# Patient Record
Sex: MALE | Race: ASIAN | URBAN - METROPOLITAN AREA
[De-identification: names, ages, dates, MRNs, and addresses within clinical notes are randomized per-mention and may not be internally consistent; named-entity substitution may affect disease eponyms.]

---

## 2021-11-04 ENCOUNTER — TELEPHONE (OUTPATIENT)
Dept: UROLOGY | Age: 61
End: 2021-11-04

## 2021-11-04 NOTE — TELEPHONE ENCOUNTER
Scheduled for 10/17/2021 at 8am due to him having to work at [employer]. To make sure he had enough time to see Park and be at work.

## 2021-11-17 ENCOUNTER — OFFICE VISIT (OUTPATIENT)
Dept: UROLOGY | Age: 61
End: 2021-11-17
Payer: COMMERCIAL

## 2021-11-17 VITALS
HEIGHT: 67 IN | SYSTOLIC BLOOD PRESSURE: 146 MMHG | WEIGHT: 200 LBS | RESPIRATION RATE: 22 BRPM | TEMPERATURE: 98.5 F | HEART RATE: 93 BPM | OXYGEN SATURATION: 98 % | DIASTOLIC BLOOD PRESSURE: 99 MMHG | BODY MASS INDEX: 31.39 KG/M2

## 2021-11-17 DIAGNOSIS — R39.11 BENIGN PROSTATIC HYPERPLASIA WITH URINARY HESITANCY: ICD-10-CM

## 2021-11-17 DIAGNOSIS — R97.20 ELEVATED PSA: ICD-10-CM

## 2021-11-17 DIAGNOSIS — N40.1 BENIGN PROSTATIC HYPERPLASIA WITH URINARY HESITANCY: ICD-10-CM

## 2021-11-17 LAB
BILIRUB UR QL STRIP: NEGATIVE
GLUCOSE UR-MCNC: NEGATIVE MG/DL
KETONES P FAST UR STRIP-MCNC: NEGATIVE MG/DL
PH UR STRIP: 6 [PH] (ref 4.6–8)
PROT UR QL STRIP: NEGATIVE
SP GR UR STRIP: 1.02 (ref 1–1.03)
UA UROBILINOGEN AMB POC: NORMAL (ref 0.2–1)
URINALYSIS CLARITY POC: CLEAR
URINALYSIS COLOR POC: YELLOW
URINE BLOOD POC: NORMAL
URINE LEUKOCYTES POC: NEGATIVE
URINE NITRITES POC: NEGATIVE

## 2021-11-17 PROCEDURE — 99204 OFFICE O/P NEW MOD 45 MIN: CPT | Performed by: UROLOGY

## 2021-11-17 PROCEDURE — 81003 URINALYSIS AUTO W/O SCOPE: CPT | Performed by: UROLOGY

## 2021-11-17 RX ORDER — ATORVASTATIN CALCIUM 40 MG/1
TABLET, FILM COATED ORAL
COMMUNITY
Start: 2021-09-30

## 2021-11-17 RX ORDER — TAMSULOSIN HYDROCHLORIDE 0.4 MG/1
0.4 CAPSULE ORAL DAILY
Qty: 90 CAPSULE | Refills: 3 | Status: SHIPPED | OUTPATIENT
Start: 2021-11-17

## 2021-11-17 RX ORDER — ASPIRIN 81 MG/1
TABLET ORAL DAILY
COMMUNITY

## 2021-11-17 RX ORDER — OMEPRAZOLE MAGNESIUM 10 MG/1
GRANULE, DELAYED RELEASE ORAL
COMMUNITY

## 2021-11-17 NOTE — ASSESSMENT & PLAN NOTE
PSA is borderline risk. We discussed options including following the level vs biopsy.   We decided to reassess after treating his mild BPH symptoms

## 2021-11-17 NOTE — PROGRESS NOTES
HISTORY OF PRESENT ILLNESS  Barrington العراقي is a 63 y.o. male. Chief Complaint   Patient presents with    New Patient    Other     Wants a exam of the prostate     He had an insurance physical in July. His PSA was 4.44 7/27/21. Prior PSA 3/19/12 was 2.0  PSA 3/31/16 was 0.91. Repeat PSA was 3.8 with 12.9%. He has LUTS. He a slower stream, gradual onset. He wakes 1x per night. He is an early riser. He feels he empties. He denies dysuria. Coffee can give him frequency. He has some hesitancy with a full bladder. He has no family history of prostate cancer. Chronic Conditions Addressed Today     1. Elevated PSA     Current Assessment & Plan       PSA is borderline risk. We discussed options including following the level vs biopsy. We decided to reassess after treating his mild BPH symptoms          Relevant Medications     tamsulosin (FLOMAX) 0.4 mg capsule     Other Relevant Orders     PSA, DIAGNOSTIC (PROSTATE SPECIFIC AG)    2. Benign prostatic hyperplasia with urinary hesitancy     Current Assessment & Plan       He is mildly bothered. We discussed tamsulosin. The patient was instructed on the dosing of the medication and reason for taking it. We discussed the common and serious risks. The patient is advised to be cautious of side effects with a new medication. Rx given          Relevant Medications     tamsulosin (FLOMAX) 0.4 mg capsule     Other Relevant Orders     AMB POC URINALYSIS DIP STICK AUTO W/O MICRO     URINALYSIS W/MICROSCOPIC     PSA, DIAGNOSTIC (PROSTATE SPECIFIC AG)          Past Medical History:    PMHx (including negatives):  has a past medical history of Hypercholesterolemia and Hypertension. PSurgHx:  has a past surgical history that includes hx colonoscopy. PSocHx:  reports that he has never smoked. He has never used smokeless tobacco. He reports previous alcohol use. He reports that he does not use drugs.      ROS  Physical Exam  Constitutional: General: He is not in acute distress. Appearance: Normal appearance. HENT:      Head: Normocephalic and atraumatic. Eyes:      Extraocular Movements: Extraocular movements intact. Pupils: Pupils are equal, round, and reactive to light. Cardiovascular:      Rate and Rhythm: Normal rate and regular rhythm. Pulmonary:      Effort: Pulmonary effort is normal. No respiratory distress. Breath sounds: Normal breath sounds. No wheezing or rhonchi. Chest:   Breasts:      Right: No supraclavicular adenopathy. Left: No supraclavicular adenopathy. Genitourinary:     Penis: Normal. No phimosis, hypospadias or tenderness. Testes: Normal.         Right: Mass, tenderness or swelling not present. Left: Mass, tenderness or swelling not present. Epididymis:      Right: Normal. No mass or tenderness. Left: Normal. No mass or tenderness. Prostate: Enlarged (1+). Not tender and no nodules present. Rectum: Normal.   Musculoskeletal:         General: Normal range of motion. Lymphadenopathy:      Cervical: No cervical adenopathy. Upper Body:      Right upper body: No supraclavicular adenopathy. Left upper body: No supraclavicular adenopathy. Skin:     General: Skin is warm and dry. Neurological:      General: No focal deficit present. Mental Status: He is alert and oriented to person, place, and time. Psychiatric:         Mood and Affect: Mood normal.         Behavior: Behavior normal.       No Known Allergies   Prior to Admission medications    Medication Sig Start Date End Date Taking? Authorizing Provider   atorvastatin (LIPITOR) 40 mg tablet  9/30/21  Yes Provider, Historical   amlodipine besylate (NORVASC PO) amlodipine   Yes Provider, Historical   Omeprazole Magnesium (PriLOSEC) 10 mg suDR Prilosec   Yes Provider, Historical   aspirin delayed-release 81 mg tablet Take  by mouth daily.    Yes Provider, Historical   tamsulosin (FLOMAX) 0.4 mg capsule Take 1 Capsule by mouth daily. 11/17/21  Yes Konrad Rodriguez MD        ASSESSMENT and PLAN  Diagnoses and all orders for this visit:    1. Benign prostatic hyperplasia with urinary hesitancy  Assessment & Plan:   He is mildly bothered. We discussed tamsulosin. The patient was instructed on the dosing of the medication and reason for taking it. We discussed the common and serious risks. The patient is advised to be cautious of side effects with a new medication. Rx given    Orders:  -     AMB POC URINALYSIS DIP STICK AUTO W/O MICRO  -     URINALYSIS W/MICROSCOPIC  -     tamsulosin (FLOMAX) 0.4 mg capsule; Take 1 Capsule by mouth daily.  -     PSA, DIAGNOSTIC (PROSTATE SPECIFIC AG); Future    2. Elevated PSA  Assessment & Plan:   PSA is borderline risk. We discussed options including following the level vs biopsy. We decided to reassess after treating his mild BPH symptoms    Orders:  -     PSA, DIAGNOSTIC (PROSTATE SPECIFIC AG);  Future         Marylu Woodruff MD

## 2021-11-17 NOTE — LETTER
11/17/2021    Patient: Mel Finley   YOB: 1960   Date of Visit: 11/17/2021     Bret Partida, Lesley [address] [id number]  Via Fax: [phone]    Dear Bret Partida DO,      Thank you for referring Mr. Mel Finley to [address] [place] [address] for evaluation. My notes for this consultation are attached. If you have questions, please do not hesitate to call me. I look forward to following your patient along with you.       Sincerely,    Rommel Baires MD

## 2021-11-17 NOTE — PROGRESS NOTES
1. Have you been to the ER, urgent care clinic since your last visit? Hospitalized since your last visit? No    2. Have you seen or consulted any other health care providers outside of the [address] [place] [address] since your last visit? Include any pap smears or colon screening.  PCP  Chief Complaint   Patient presents with    New Patient    Other     Wants a exam of the prostate     Visit Vitals  BP (!) 146/99 (BP 1 Location: Left arm, BP Patient Position: Sitting, BP Cuff Size: Adult)   Pulse 93   Temp 98.5 °F (36.9 °C) (Temporal)   Resp 22   Ht 5' 7\" (1.702 m)   Wt 200 lb (90.7 kg)   SpO2 98%   BMI 31.32 kg/m²

## 2021-11-17 NOTE — ASSESSMENT & PLAN NOTE
He is mildly bothered. We discussed tamsulosin. The patient was instructed on the dosing of the medication and reason for taking it. We discussed the common and serious risks. The patient is advised to be cautious of side effects with a new medication.     Rx given

## 2021-11-18 LAB
APPEARANCE UR: CLEAR
BACTERIA #/AREA URNS HPF: NORMAL /[HPF]
BILIRUB UR QL STRIP: NEGATIVE
CASTS URNS QL MICRO: NORMAL /LPF
COLOR UR: YELLOW
EPI CELLS #/AREA URNS HPF: NORMAL /HPF (ref 0–10)
GLUCOSE UR QL: NEGATIVE
HGB UR QL STRIP: NEGATIVE
KETONES UR QL STRIP: NEGATIVE
LEUKOCYTE ESTERASE UR QL STRIP: NEGATIVE
MICRO URNS: NORMAL
MICRO URNS: NORMAL
NITRITE UR QL STRIP: NEGATIVE
PH UR STRIP: 6 [PH] (ref 5–7.5)
PROT UR QL STRIP: NEGATIVE
RBC #/AREA URNS HPF: NORMAL /HPF (ref 0–2)
SP GR UR: 1.02 (ref 1–1.03)
UROBILINOGEN UR STRIP-MCNC: 1 MG/DL (ref 0.2–1)
WBC #/AREA URNS HPF: NORMAL /HPF (ref 0–5)

## 2022-02-11 ENCOUNTER — TELEPHONE (OUTPATIENT)
Dept: UROLOGY | Age: 62
End: 2022-02-11

## 2022-02-11 NOTE — TELEPHONE ENCOUNTER
Patient was returning [facility] call he said he has already had his PSA done

## 2022-02-11 NOTE — TELEPHONE ENCOUNTER
Attempted to contact pt to remind him to have psa drawn prior to apt on Wednesday, no answer and mailbox is full

## 2022-02-16 ENCOUNTER — OFFICE VISIT (OUTPATIENT)
Dept: UROLOGY | Age: 62
End: 2022-02-16
Payer: COMMERCIAL

## 2022-02-16 VITALS — BODY MASS INDEX: 31.39 KG/M2 | HEIGHT: 67 IN | WEIGHT: 200 LBS

## 2022-02-16 DIAGNOSIS — R97.20 ELEVATED PSA: Primary | ICD-10-CM

## 2022-02-16 DIAGNOSIS — R39.11 BENIGN PROSTATIC HYPERPLASIA WITH URINARY HESITANCY: ICD-10-CM

## 2022-02-16 DIAGNOSIS — N40.1 BENIGN PROSTATIC HYPERPLASIA WITH URINARY HESITANCY: ICD-10-CM

## 2022-02-16 LAB
BILIRUB UR QL: NEGATIVE
GLUCOSE UR-MCNC: NEGATIVE MG/DL
KETONES P FAST UR STRIP-MCNC: NEGATIVE MG/DL
PH UR STRIP: 7 [PH] (ref 4.6–8)
PROT UR QL STRIP: NEGATIVE
SP GR UR STRIP: 1.01 (ref 1–1.03)
UA UROBILINOGEN AMB POC: NORMAL (ref 0.2–1)
URINALYSIS CLARITY POC: CLEAR
URINALYSIS COLOR POC: YELLOW
URINE BLOOD POC: NEGATIVE
URINE LEUKOCYTES POC: NEGATIVE
URINE NITRITES POC: NEGATIVE

## 2022-02-16 PROCEDURE — 81003 URINALYSIS AUTO W/O SCOPE: CPT | Performed by: UROLOGY

## 2022-02-16 PROCEDURE — 99214 OFFICE O/P EST MOD 30 MIN: CPT | Performed by: UROLOGY

## 2022-02-16 NOTE — PROGRESS NOTES
Chief Complaint   Patient presents with    Follow-up    Benign Prostatic Hypertrophy    Elevated PSA    Urinary Retention     1. Have you been to the ER, urgent care clinic since your last visit? Hospitalized since your last visit? No    2. Have you seen or consulted any other health care providers outside of the [address] [place] [address] since your last visit? Include any pap smears or colon screening.  no   Visit Vitals  Ht 5' 7\" (1.702 m)   Wt 200 lb (90.7 kg)   BMI 31.32 kg/m²

## 2022-02-16 NOTE — PROGRESS NOTES
HISTORY OF PRESENT ILLNESS  Ranell Closs is a 57 y.o. male. has a past medical history of Hypercholesterolemia and Hypertension. has a past surgical history that includes hx colonoscopy. Chief Complaint   Patient presents with    Follow-up    Benign Prostatic Hypertrophy    Elevated PSA    Urinary Retention     He has a freer flow on tamsulosin. He has some urgency but not really present now. He has concern about his health. He has a lot of stress. No family h/o of prostate cancer. Chronic Conditions Addressed Today     1. Elevated PSA - Primary     Overview      PSA was 2.0 on 3/19/12  PSA was 0.91 on 3/31/16  PSA was 4.44 on 7/27/21.      Repeat PSA was 3.8 with 12.9%. (unclear date). Current Assessment & Plan       PSA 5.2 on 2/4/22. We will plan an MRI of the prostate. He may need a biopsy of the prostate. We had that discussion. 2. Benign prostatic hyperplasia with urinary hesitancy     Overview      11/17/21: He has LUTS. He a slower stream, gradual onset. He wakes 1x per night. He is an early riser. He feels he empties. He denies dysuria. Coffee can give him frequency. He has some hesitancy with a full bladder. Started on tamsulosin. Current Assessment & Plan       He has symptoms of BPH. He has been taking tamsulosin around 6-7PM but his schedule shifted at work. He was missing his meds at the usual times. He was holding his urine more then. Past Medical History:    PMHx (including negatives):  has a past medical history of Hypercholesterolemia and Hypertension. PSurgHx:  has a past surgical history that includes hx colonoscopy. PSocHx:  reports that he has never smoked. He has never used smokeless tobacco. He reports previous alcohol use. He reports that he does not use drugs. ROS  Physical Exam  No Known Allergies   Prior to Admission medications    Medication Sig Start Date End Date Taking?  Authorizing Provider atorvastatin (LIPITOR) 40 mg tablet  9/30/21  Yes Provider, Historical   amlodipine besylate (NORVASC PO) amlodipine   Yes Provider, Historical   Omeprazole Magnesium (PriLOSEC) 10 mg suDR Prilosec   Yes Provider, Historical   aspirin delayed-release 81 mg tablet Take  by mouth daily. Yes Provider, Historical   tamsulosin (FLOMAX) 0.4 mg capsule Take 1 Capsule by mouth daily. 11/17/21  Yes Bonnie Monterroso MD        ASSESSMENT and PLAN  Diagnoses and all orders for this visit:    1. Elevated PSA  Assessment & Plan:   PSA 5.2 on 2/4/22. We will plan an MRI of the prostate. He may need a biopsy of the prostate. We had that discussion. 2. Benign prostatic hyperplasia with urinary hesitancy  Assessment & Plan:   He has symptoms of BPH. He has been taking tamsulosin around 6-7PM but his schedule shifted at work. He was missing his meds at the usual times. He was holding his urine more then.             Stephanie Mccain MD

## 2022-02-16 NOTE — ASSESSMENT & PLAN NOTE
PSA 5.2 on 2/4/22. We will plan an MRI of the prostate. He may need a biopsy of the prostate. We had that discussion.

## 2022-02-16 NOTE — LETTER
2/16/2022    Patient: Yulisa Evangelista   YOB: 1960   Date of Visit: 2/16/2022     Lesley Salmeron [address] [id number]  Via Fax: [phone]    Dear Tommy Brown DO,      Thank you for referring Mr. Yulisa Evangelista to [facility] for evaluation. My notes for this consultation are attached. If you have questions, please do not hesitate to call me. I look forward to following your patient along with you.       Sincerely,    Debbie Silva MD

## 2022-02-16 NOTE — ASSESSMENT & PLAN NOTE
He has symptoms of BPH. He has been taking tamsulosin around 6-7PM but his schedule shifted at work. He was missing his meds at the usual times. He was holding his urine more then.

## 2022-03-16 ENCOUNTER — VIRTUAL VISIT (OUTPATIENT)
Dept: UROLOGY | Age: 62
End: 2022-03-16
Payer: COMMERCIAL

## 2022-03-16 DIAGNOSIS — R97.20 ELEVATED PSA: ICD-10-CM

## 2022-03-16 DIAGNOSIS — N40.1 BENIGN PROSTATIC HYPERPLASIA WITH URINARY HESITANCY: ICD-10-CM

## 2022-03-16 DIAGNOSIS — N41.1 CHRONIC PROSTATITIS: ICD-10-CM

## 2022-03-16 DIAGNOSIS — R39.11 BENIGN PROSTATIC HYPERPLASIA WITH URINARY HESITANCY: ICD-10-CM

## 2022-03-16 PROCEDURE — 99214 OFFICE O/P EST MOD 30 MIN: CPT | Performed by: UROLOGY

## 2022-03-16 RX ORDER — DUTASTERIDE 0.5 MG/1
0.5 CAPSULE, LIQUID FILLED ORAL DAILY
Qty: 90 CAPSULE | Refills: 3 | Status: SHIPPED | OUTPATIENT
Start: 2022-03-16

## 2022-03-16 RX ORDER — CIPROFLOXACIN 250 MG/1
250 TABLET, FILM COATED ORAL 2 TIMES DAILY
Qty: 42 TABLET | Refills: 3 | Status: SHIPPED | OUTPATIENT
Start: 2022-03-16

## 2022-03-16 NOTE — ASSESSMENT & PLAN NOTE
His symptoms may be exacerbated by findings of prostatitis. We can treat with a course of antibiotics. We discussed Cipro 250mg bid x 21 days, 1 refill if needed. The patient was instructed on the dosing of the medication and reason for taking it. We discussed the common and serious risks. The patient is advised to be cautious of side effects with a new medication.

## 2022-03-16 NOTE — ASSESSMENT & PLAN NOTE
He has a large prostate on MRI, 79. 4mL 3/7/22. Due to his urinary symptoms I would consider starting a 5 alpha reductase inhibitor. The patient was instructed on the dosing of the medication and reason for taking it. We discussed the common and serious risks. The patient is advised to be cautious of side effects with a new medication. He wishes to start this. Continue tamsulosin as well.

## 2022-03-16 NOTE — PROGRESS NOTES
HISTORY OF PRESENT ILLNESS  Deisy Rivera is a 57 y.o. male. has a past medical history of Hypercholesterolemia and Hypertension. has a past surgical history that includes hx colonoscopy. Chief Complaint   Patient presents with    Follow-up    Results    Elevated PSA      Deisy Rivera, was evaluated through a synchronous (real-time) audio-video encounter. The patient (or guardian if applicable) is aware that this is a billable service, which includes applicable co-pays. This Virtual Visit was conducted with patient's (and/or legal guardian's) consent. The visit was conducted pursuant to the emergency declaration under the [facility], [address] [place] [address] authority and the [employer] Act. Patient identification was verified, and a caregiver was present when appropriate. The patient was located in a state where the provider was licensed to provide care. HPI  Chronic Conditions Addressed Today     1. Elevated PSA     Overview      PSA was 2.0 on 3/19/12  PSA was 0.91 on 3/31/16  PSA was 4.44 on 7/27/21. PSA  Was 5.4 on 2/4/22. Current Assessment & Plan       Prostate MRI 3/7/22 was consistent with prostatitis and BPH. No PIRADs lesions of concern. Plan to observe the PSA trend on 5ARI. Reassess F/T PSA in 6m. Relevant Medications     dutasteride (Avodart) 0.5 mg capsule     Other Relevant Orders     PSA, TOTAL &  FREE    2. Benign prostatic hyperplasia with urinary hesitancy     Overview      11/17/21: He has LUTS. He a slower stream, gradual onset. He wakes 1x per night. He is an early riser. He feels he empties. He denies dysuria. Coffee can give him frequency. He has some hesitancy with a full bladder. Started on tamsulosin. 2/16/22: He has been missing his medication and holding his urine. Current Assessment & Plan       He has a large prostate on MRI, 79. 4mL 3/7/22.    Due to his urinary symptoms I would consider starting a 5 alpha reductase inhibitor. The patient was instructed on the dosing of the medication and reason for taking it. We discussed the common and serious risks. The patient is advised to be cautious of side effects with a new medication. He wishes to start this. Continue tamsulosin as well. Relevant Medications     dutasteride (Avodart) 0.5 mg capsule     Other Relevant Orders     PSA, TOTAL &  FREE    3. Chronic prostatitis     Overview      3/7/22 MRI prostate consistent with prostatitis. Current Assessment & Plan       His symptoms may be exacerbated by findings of prostatitis. We can treat with a course of antibiotics. We discussed Cipro 250mg bid x 21 days, 1 refill if needed. The patient was instructed on the dosing of the medication and reason for taking it. We discussed the common and serious risks. The patient is advised to be cautious of side effects with a new medication. Relevant Medications     dutasteride (Avodart) 0.5 mg capsule          Past Medical History:    PMHx (including negatives):  has a past medical history of Hypercholesterolemia and Hypertension. PSurgHx:  has a past surgical history that includes hx colonoscopy. PSocHx:  reports that he has never smoked. He has never used smokeless tobacco. He reports previous alcohol use. He reports that he does not use drugs. Review of Systems   All other systems reviewed and are negative. Physical Exam  No Known Allergies   Prior to Admission medications    Medication Sig Start Date End Date Taking? Authorizing Provider   dutasteride (Avodart) 0.5 mg capsule Take 1 Capsule by mouth daily. 3/16/22  Yes Jo Sarmiento MD   ciprofloxacin HCl (Cipro) 250 mg tablet Take 1 Tablet by mouth two (2) times a day.  3/16/22  Yes Jo Sarmiento MD   atorvastatin (LIPITOR) 40 mg tablet  9/30/21  Yes Provider, Historical   amlodipine besylate (NORVASC PO) amlodipine Yes Provider, Historical   Omeprazole Magnesium (PriLOSEC) 10 mg suDR Prilosec   Yes Provider, Historical   aspirin delayed-release 81 mg tablet Take  by mouth daily. Yes Provider, Historical   tamsulosin (FLOMAX) 0.4 mg capsule Take 1 Capsule by mouth daily. 11/17/21  Yes Marcia Barraza MD        ASSESSMENT and PLAN  Diagnoses and all orders for this visit:    1. Elevated PSA  Assessment & Plan:   Prostate MRI 3/7/22 was consistent with prostatitis and BPH. No PIRADs lesions of concern. Plan to observe the PSA trend on 5ARI. Reassess F/T PSA in 6m. Orders:  -     PSA, TOTAL &  FREE; Future    2. Benign prostatic hyperplasia with urinary hesitancy  Assessment & Plan:   He has a large prostate on MRI, 79. 4mL 3/7/22. Due to his urinary symptoms I would consider starting a 5 alpha reductase inhibitor. The patient was instructed on the dosing of the medication and reason for taking it. We discussed the common and serious risks. The patient is advised to be cautious of side effects with a new medication. He wishes to start this. Continue tamsulosin as well. Orders:  -     PSA, TOTAL &  FREE; Future    3. Chronic prostatitis  Assessment & Plan:   His symptoms may be exacerbated by findings of prostatitis. We can treat with a course of antibiotics. We discussed Cipro 250mg bid x 21 days, 1 refill if needed. The patient was instructed on the dosing of the medication and reason for taking it. We discussed the common and serious risks. The patient is advised to be cautious of side effects with a new medication. Other orders  -     dutasteride (Avodart) 0.5 mg capsule; Take 1 Capsule by mouth daily. -     ciprofloxacin HCl (Cipro) 250 mg tablet; Take 1 Tablet by mouth two (2) times a day.       Nabeel Burk MD

## 2022-03-16 NOTE — ASSESSMENT & PLAN NOTE
Prostate MRI 3/7/22 was consistent with prostatitis and BPH. No PIRADs lesions of concern. Plan to observe the PSA trend on 5ARI. Reassess F/T PSA in 6m.

## 2022-03-17 ENCOUNTER — TELEPHONE (OUTPATIENT)
Dept: UROLOGY | Age: 62
End: 2022-03-17

## 2022-03-17 NOTE — TELEPHONE ENCOUNTER
Tried calling patient to schedule 6 month follow up PSA  appt but mailbox was full.  Will try later today Sent to you in case they call and you answer

## 2022-03-18 PROBLEM — R97.20 ELEVATED PSA: Status: ACTIVE | Noted: 2021-11-17

## 2022-03-19 PROBLEM — N40.1 BENIGN PROSTATIC HYPERPLASIA WITH URINARY HESITANCY: Status: ACTIVE | Noted: 2021-11-17

## 2022-03-19 PROBLEM — R39.11 BENIGN PROSTATIC HYPERPLASIA WITH URINARY HESITANCY: Status: ACTIVE | Noted: 2021-11-17

## 2022-03-24 PROBLEM — N41.1 CHRONIC PROSTATITIS: Status: ACTIVE | Noted: 2022-03-16

## 2022-11-07 DIAGNOSIS — N40.1 BENIGN PROSTATIC HYPERPLASIA WITH URINARY HESITANCY: ICD-10-CM

## 2022-11-07 DIAGNOSIS — R39.11 BENIGN PROSTATIC HYPERPLASIA WITH URINARY HESITANCY: ICD-10-CM

## 2022-11-08 DIAGNOSIS — N40.1 BENIGN PROSTATIC HYPERPLASIA WITH URINARY HESITANCY: Primary | ICD-10-CM

## 2022-11-08 DIAGNOSIS — R39.11 BENIGN PROSTATIC HYPERPLASIA WITH URINARY HESITANCY: Primary | ICD-10-CM

## 2022-11-08 RX ORDER — DUTASTERIDE 0.5 MG/1
0.5 CAPSULE, LIQUID FILLED ORAL DAILY
Qty: 90 CAPSULE | Refills: 3 | Status: SHIPPED | OUTPATIENT
Start: 2022-11-08 | End: 2022-11-11 | Stop reason: SDUPTHER

## 2022-11-08 RX ORDER — TAMSULOSIN HYDROCHLORIDE 0.4 MG/1
CAPSULE ORAL
Qty: 90 CAPSULE | Refills: 3 | Status: SHIPPED | OUTPATIENT
Start: 2022-11-08 | End: 2022-11-11 | Stop reason: SDUPTHER

## 2022-11-11 DIAGNOSIS — R39.11 BENIGN PROSTATIC HYPERPLASIA WITH URINARY HESITANCY: ICD-10-CM

## 2022-11-11 DIAGNOSIS — N40.1 BENIGN PROSTATIC HYPERPLASIA WITH URINARY HESITANCY: ICD-10-CM

## 2022-11-11 RX ORDER — DUTASTERIDE 0.5 MG/1
0.5 CAPSULE, LIQUID FILLED ORAL DAILY
Qty: 90 CAPSULE | Refills: 3 | Status: SHIPPED | OUTPATIENT
Start: 2022-11-11

## 2022-11-11 RX ORDER — TAMSULOSIN HYDROCHLORIDE 0.4 MG/1
0.4 CAPSULE ORAL DAILY
Qty: 90 CAPSULE | Refills: 3 | Status: SHIPPED | OUTPATIENT
Start: 2022-11-11

## 2023-07-28 RX ORDER — DUTASTERIDE 0.5 MG/1
0.5 CAPSULE, LIQUID FILLED ORAL DAILY
Qty: 90 CAPSULE | Refills: 0 | Status: SHIPPED | OUTPATIENT
Start: 2023-07-28 | End: 2023-10-26

## 2023-07-28 RX ORDER — TAMSULOSIN HYDROCHLORIDE 0.4 MG/1
0.4 CAPSULE ORAL DAILY
Qty: 90 CAPSULE | Refills: 0 | Status: SHIPPED | OUTPATIENT
Start: 2023-07-28 | End: 2023-10-26

## 2023-12-14 ENCOUNTER — OFFICE VISIT (OUTPATIENT)
Age: 63
End: 2023-12-14
Payer: COMMERCIAL

## 2023-12-14 VITALS — HEIGHT: 66 IN | BODY MASS INDEX: 30.53 KG/M2 | WEIGHT: 190 LBS

## 2023-12-14 DIAGNOSIS — N40.1 BENIGN PROSTATIC HYPERPLASIA WITH URINARY HESITANCY: ICD-10-CM

## 2023-12-14 DIAGNOSIS — R97.20 ELEVATED PSA: Primary | ICD-10-CM

## 2023-12-14 DIAGNOSIS — R39.11 BENIGN PROSTATIC HYPERPLASIA WITH URINARY HESITANCY: ICD-10-CM

## 2023-12-14 LAB
BILIRUBIN, URINE, POC: NEGATIVE
BLOOD URINE, POC: NEGATIVE
GLUCOSE URINE, POC: NEGATIVE
KETONES, URINE, POC: NEGATIVE
LEUKOCYTE ESTERASE, URINE, POC: NEGATIVE
NITRITE, URINE, POC: NEGATIVE
PH, URINE, POC: 7 (ref 4.6–8)
PROTEIN,URINE, POC: NEGATIVE
SPECIFIC GRAVITY, URINE, POC: 1.02 (ref 1–1.03)
URINALYSIS CLARITY, POC: CLEAR
URINALYSIS COLOR, POC: YELLOW
UROBILINOGEN, POC: NORMAL

## 2023-12-14 PROCEDURE — 81003 URINALYSIS AUTO W/O SCOPE: CPT | Performed by: UROLOGY

## 2023-12-14 PROCEDURE — 99214 OFFICE O/P EST MOD 30 MIN: CPT | Performed by: UROLOGY

## 2023-12-14 RX ORDER — AMLODIPINE BESYLATE 2.5 MG/1
2.5 TABLET ORAL DAILY
COMMUNITY

## 2023-12-14 RX ORDER — DUTASTERIDE 0.5 MG/1
0.5 CAPSULE, LIQUID FILLED ORAL DAILY
Qty: 90 CAPSULE | Refills: 3 | Status: SHIPPED | OUTPATIENT
Start: 2023-12-14

## 2023-12-14 RX ORDER — TAMSULOSIN HYDROCHLORIDE 0.4 MG/1
0.4 CAPSULE ORAL DAILY
Qty: 90 CAPSULE | Refills: 3 | Status: SHIPPED | OUTPATIENT
Start: 2023-12-14

## 2023-12-14 NOTE — PROGRESS NOTES
HISTORY OF PRESENT ILLNESS  Charito Ramirez is a 58 y.o. male. has a past medical history of Hypercholesterolemia and Hypertension. has a past surgical history that includes Colonoscopy. Chief Complaint   Patient presents with    Follow-up    Elevated PSA    Benign Prostatic Hypertrophy     He is voiding well. He notes less energy and libido since starting dutasteride. It has recovered some. His hair is thicker. Elevated PSA    Benign Prostatic Hypertrophy        1. Elevated PSA  Overview:  PSA was 2.0 on 3/19/12  PSA was 0.91 on 3/31/16  PSA was 4.44 on 7/27/21. PSA  was 5.4 on 2/4/22.  10/19/22 2.0/ 16%free on dutasteride  Assessment & Plan:  Observe trends on dutasteride  Orders:  -     AMB POC URINALYSIS DIP STICK AUTO W/O MICRO  -     PSA, Total and Free  -     PSA, Diagnostic; Future  2. Benign prostatic hyperplasia with urinary hesitancy  Overview:  BPH with slow stream and frequency. He is on dutasteride and tamsulosin. Assessment & Plan:  Voiding well on medication. Benign exam.  Continue medication   Orders:  -     AMB POC URINALYSIS DIP STICK AUTO W/O MICRO  -     PSA, Diagnostic; Future      Past Medical History:  PMHx (including negatives):  has a past medical history of Hypercholesterolemia and Hypertension. PSurgHx:  has a past surgical history that includes Colonoscopy. PSocHx:  reports that he has never smoked. He has never used smokeless tobacco. He reports that he does not currently use alcohol. He reports that he does not use drugs. FamilyHX: No family history on file. Review of Systems    Physical Exam  Constitutional:       General: He is not in acute distress. Appearance: He is not ill-appearing. Pulmonary:      Effort: Pulmonary effort is normal.   Abdominal:      Palpations: There is no mass. Tenderness: There is no abdominal tenderness. There is no guarding or rebound. Hernia: No hernia is present.    Genitourinary:     Penis: Normal. No

## 2024-01-10 ENCOUNTER — PATIENT MESSAGE (OUTPATIENT)
Age: 64
End: 2024-01-10

## 2024-12-03 DIAGNOSIS — R97.20 ELEVATED PSA: ICD-10-CM

## 2024-12-03 DIAGNOSIS — R39.11 BENIGN PROSTATIC HYPERPLASIA WITH URINARY HESITANCY: ICD-10-CM

## 2024-12-03 DIAGNOSIS — N40.1 BENIGN PROSTATIC HYPERPLASIA WITH URINARY HESITANCY: ICD-10-CM

## 2025-01-11 DIAGNOSIS — R39.11 BENIGN PROSTATIC HYPERPLASIA WITH URINARY HESITANCY: Primary | ICD-10-CM

## 2025-01-11 DIAGNOSIS — N40.1 BENIGN PROSTATIC HYPERPLASIA WITH URINARY HESITANCY: Primary | ICD-10-CM

## 2025-01-13 RX ORDER — DUTASTERIDE 0.5 MG/1
0.5 CAPSULE, LIQUID FILLED ORAL DAILY
Qty: 90 CAPSULE | Refills: 3 | Status: SHIPPED | OUTPATIENT
Start: 2025-01-13

## 2025-01-13 RX ORDER — TAMSULOSIN HYDROCHLORIDE 0.4 MG/1
0.4 CAPSULE ORAL DAILY
Qty: 90 CAPSULE | Refills: 3 | OUTPATIENT
Start: 2025-01-13

## 2025-01-13 RX ORDER — DUTASTERIDE 0.5 MG/1
0.5 CAPSULE, LIQUID FILLED ORAL DAILY
Qty: 90 CAPSULE | Refills: 3 | OUTPATIENT
Start: 2025-01-13

## 2025-01-13 RX ORDER — TAMSULOSIN HYDROCHLORIDE 0.4 MG/1
0.4 CAPSULE ORAL DAILY
Qty: 90 CAPSULE | Refills: 3 | Status: SHIPPED | OUTPATIENT
Start: 2025-01-13

## 2025-02-14 ENCOUNTER — OFFICE VISIT (OUTPATIENT)
Age: 65
End: 2025-02-14
Payer: COMMERCIAL

## 2025-02-14 VITALS
HEART RATE: 87 BPM | WEIGHT: 250 LBS | HEIGHT: 66 IN | BODY MASS INDEX: 40.18 KG/M2 | SYSTOLIC BLOOD PRESSURE: 131 MMHG | DIASTOLIC BLOOD PRESSURE: 83 MMHG

## 2025-02-14 DIAGNOSIS — R39.11 BENIGN PROSTATIC HYPERPLASIA WITH URINARY HESITANCY: ICD-10-CM

## 2025-02-14 DIAGNOSIS — R97.20 ELEVATED PSA: Primary | ICD-10-CM

## 2025-02-14 DIAGNOSIS — N40.1 BENIGN PROSTATIC HYPERPLASIA WITH URINARY HESITANCY: ICD-10-CM

## 2025-02-14 PROBLEM — N41.1 CHRONIC PROSTATITIS: Status: RESOLVED | Noted: 2022-03-16 | Resolved: 2025-02-14

## 2025-02-14 PROCEDURE — 1123F ACP DISCUSS/DSCN MKR DOCD: CPT | Performed by: UROLOGY

## 2025-02-14 PROCEDURE — 99214 OFFICE O/P EST MOD 30 MIN: CPT | Performed by: UROLOGY

## 2025-02-14 RX ORDER — DUTASTERIDE 0.5 MG/1
0.5 CAPSULE, LIQUID FILLED ORAL DAILY
Qty: 90 CAPSULE | Refills: 3 | Status: SHIPPED | OUTPATIENT
Start: 2025-02-14

## 2025-02-14 RX ORDER — TAMSULOSIN HYDROCHLORIDE 0.4 MG/1
0.4 CAPSULE ORAL DAILY
Qty: 90 CAPSULE | Refills: 3 | Status: SHIPPED | OUTPATIENT
Start: 2025-02-14

## 2025-02-14 RX ORDER — AMLODIPINE BESYLATE 5 MG/1
TABLET ORAL
COMMUNITY
Start: 2024-12-11

## 2025-02-14 NOTE — PROGRESS NOTES
Chief Complaint   Patient presents with    Annual Exam     1. Have you been to the ER, urgent care clinic since your last visit?  Hospitalized since your last visit?No    2. Have you seen or consulted any other health care providers outside of the [facility] since your last visit?  Include any pap smears or colon screening. No    
MD   aspirin 81 MG EC tablet Take by mouth daily   Yes Automatic Reconciliation, Ar   atorvastatin (LIPITOR) 40 MG tablet ceived the following from [facility]: Outside name: atorvastatin (LIPITOR) 40 mg tablet 9/30/21  Yes Automatic Reconciliation, Ar   amLODIPine (NORVASC) 2.5 MG tablet Take 1 tablet by mouth daily  Patient not taking: Reported on 2/14/2025    Provider, Bonnie, MD   Omeprazole Magnesium (PRILOSEC) 10 MG PACK Prilosec  Patient not taking: Reported on 2/14/2025    Automatic Reconciliation, Ar     Past Medical History:  PMHx (including negatives):  has a past medical history of Hypercholesterolemia and Hypertension.   PSurgHx:  has a past surgical history that includes Colonoscopy.  PSocHx:  reports that he has never smoked. He has never used smokeless tobacco. He reports that he does not currently use alcohol. He reports that he does not use drugs.   FamilyHX: History reviewed. No pertinent family history.    Review of Systems    Physical Exam  Constitutional:       General: He is not in acute distress.     Appearance: He is not ill-appearing.   Pulmonary:      Effort: Pulmonary effort is normal.   Abdominal:      Palpations: There is no mass.      Tenderness: There is no abdominal tenderness. There is no guarding or rebound.      Hernia: No hernia is present.   Genitourinary:     Penis: Normal. No tenderness, discharge or lesions.       Testes:         Right: Mass, tenderness or swelling not present.         Left: Mass, tenderness or swelling not present.      Epididymis:      Right: Not inflamed. No tenderness.      Left: Not inflamed. No tenderness.      Prostate: Not enlarged (2+, benign), not tender and no nodules present.             ASSESSMENT and PLAN  1. Elevated PSA  Assessment & Plan:  PSA low on 5ARI.  Continue.   Orders:  -     PSA, Diagnostic; Future  -     dutasteride (AVODART) 0.5 MG capsule; Take 1 capsule by mouth daily, Disp-90 capsule, R-3Normal  -